# Patient Record
Sex: MALE | Race: BLACK OR AFRICAN AMERICAN
[De-identification: names, ages, dates, MRNs, and addresses within clinical notes are randomized per-mention and may not be internally consistent; named-entity substitution may affect disease eponyms.]

---

## 2019-06-17 ENCOUNTER — HOSPITAL ENCOUNTER (EMERGENCY)
Dept: HOSPITAL 88 - ER | Age: 39
Discharge: HOME | End: 2019-06-17
Payer: MEDICARE

## 2019-06-17 VITALS — WEIGHT: 142 LBS | HEIGHT: 70 IN | BODY MASS INDEX: 20.33 KG/M2

## 2019-06-17 DIAGNOSIS — B20: ICD-10-CM

## 2019-06-17 DIAGNOSIS — R10.30: Primary | ICD-10-CM

## 2019-06-17 DIAGNOSIS — K40.91: ICD-10-CM

## 2019-06-17 PROCEDURE — 99282 EMERGENCY DEPT VISIT SF MDM: CPT

## 2019-06-17 NOTE — XMS REPORT
[facility] Services Summary

                             Created on: 2019



Kvng Oviedo

External Reference #: [id number]

: 1980

Sex: Male



Demographics







                          Address                   [address] [place]  [address]

 

                          Home Phone                [phone]

 

                          Preferred Language        English

 

                          Marital Status            A

 

                          [employer] Affiliation     Unknown

 

                          Race                      Unknown

 

                          Ethnic Group              Unknown





Author







                          Author                    Admin, [place]

 

                          Organization              [facility]

 

                          Address                   [address]

[place]  [address]



 

                          Phone                     [phone]







Allergies, Adverse Reactions, Alerts







           Allergy Name    Reaction Description    Start Date    Severity    Status     Provider

 

           No Known Allergies                                         Charly Lazar CMA







Conditions or Problems







        Problem Name    Problem Code    Onset Date    Status    Entry Date    Provider    Comment    Standard

 Description                            Annotate

 

        Cough    786.2        Active        Svetlana Tavera MD            Cough     

 

        Diarrhea    787.91        Active        Svetlana Tavera MD            Diarrhea 

                                         

 

        Bipolar disorder    296.80        Active        Svetlana Tavera MD            Bipolar

 disorder, unspecified                   

 

        Hallucinations    780.1        Active        Svetlana Tavera MD            Hallucinations

                                         

 

           Health care maintenance    V70.0          Active         Svetlana Tavera MD

                                        Routine general medical examination at a health care facility     

 

        Hepatitis B    070.30        Active        Svetlana Tavera MD            Viral 

hepatitis B without mention of hepatic coma, acute or unspecified, without 
mention of hepatitis delta               

 

        Psychosis    298.9        Active        Svetlana Tavera MD            Unspecified

 psychosis                               

 

           Schizoaffective disorder    295.70         Active         Svetlana Tavera MD                                      Schizoaffective disorder, unspecified     

 

        Tobacco use                Active        Svetlana Tavera MD            Tobacco use

 disorder                                

 

        HIV infection    042         Active        Patrice Paniagua            Human immunodeficiency

 virus [HIV] disease                     

 

             Pre-procedural laboratory examination    ICD-V72.63        Inactive

                    Svetlana Tavera MD               

 

             Pre-procedural laboratory examination    V72.63           Resolved      

                Svetlana Tavera MD                    Pre-procedural laboratory examination     







Medication List







        Medication    Instructions    Start Date    Stop Date    Generic Name    NDC     Status    Provider

                                        Patient Instruction

 

                          ABILIFY 10 MG ORAL TABLET    Take 1/2 tablet By Mouth QHS for one week then increase

 to 1 tablet By Mouth QHS                   ARIPIPRAZOLE    [id number]    Active     Dulce Leung PMHNP                                       Active

 

                          HYDROXYZINE HCL 50 MG ORAL TABLET    Take 1 tablet By Mouth BID As Needed for anxiety

                        HYDROXYZINE HCL    [id number]    Active    Dulce Leung PMHNP      

                                        Active

 

             MIRTAZAPINE 30 MG ORAL TABLET    Take 1/2-1 tablet By Mouth QHS                     MIRTAZAPINE

             [id number]    Active       Dulce Leung PMHNP                 Active

 

                    SYMTUZA [id number] MG ORAL TABLET    1 tab by mouth once daily with food    

                    [id number]    [id number]    Active    Svetlana Tavera MD              Active









Vital Signs







           Date       Name       Value      Unit       Range      Description

 

               blood pressure, diastolic    89         mm[Hg]                BP healy

 

               blood pressure, systolic    137        mm[Hg]                BP sys

 

               height E&M    70         [in_us]               Bdy height

 

               pulse rate E&M    90         /min                  Heart rate

 

               respiratory rate E&M    14         /min                  Resp rate

 

               temperature E&M    98.2       [degF]                Body temperature

 

               weight E&M    142        [lb_av]               Weight Measured

 

               blood pressure, diastolic    74         mm[Hg]                BP healy

 

               blood pressure, systolic    120        mm[Hg]                BP sys

 

               height E&M    70         [in_us]               Bdy height

 

               pulse rate E&M    86         /min                  Heart rate

 

               respiratory rate E&M    12         /min                  Resp rate

 

               temperature E&M    98.3       [degF]                Body temperature

 

               weight E&M    150        [lb_av]               Weight Measured







Diagnostic Results







           Date       Name       Value      Unit       Range      Description

 

                                        Lab Report: HBV Real-Time PCR, Quant - Serology 

 

               Hepatitis B virus DNA, by Polymerase Chain Reaction    1900                              

 

                                        Lab Report: CD4/CD8 Ratio Profile, Comp. Metabolic Panel (14), RNA, PCR( ... - Chemistry

 

 

               bilirubin, serum, direct    0.10       mg/dL      0.00-0.40     

 

                                        Lab Report: Lipid Panel - Chemistry 

 

               very low density lipoproteins    12         mg/dL      5-40        

 

                                        Lab Report: CD4/CD8 Ratio Profile, Comp. Metabolic Panel (14), RNA, PCR( ... - Chemistry

 

 

               hepatitis B surface antigen    Positive               Negative     

 

               chloride, serum    103        mmol/L           

 

                                        Lab Report: CD4/CD8 Ratio Profile, Comp. Metabolic Panel (14), RNA, PCR( ... - Serology

 

 

               HIV genotype result    GENRTI                            

 

                                        Append: [facility] Testing - Chemistry 

 

               HIV rapid test results    positive                           

 

                                        Lab Report: CD4/CD8 Ratio Profile, Comp. Metabolic Panel (14), RNA, PCR( ... - Microbiology

 

 

               hepatitis A antibody, total    Positive               Negative     

 

                                        Lab Report: CD4/CD8 Ratio Profile, Comp. Metabolic Panel (14), RNA, PCR( ... - Chemistry

 

 

               urea nitrogen, blood    14         mg/dL      6-24        

 

                                        Lab Report: CD4/CD8 Ratio Profile, Comp. Metabolic Panel (14), RNA, PCR( ... - Serology

 

 

               HIV-1/HIV-2 Ab, serum    Positive               Negative     

 

                                        Lab Report: Urinalysis, Routine, Prot+CreatU (Random), Ct, Ng, Trich vag ... - Urinalysis

 

 

               leukocyte esterase, urine, by dipstick    Negative               Negative     

 

                                        Lab Report: QuantiFERON-TB Gold Plus, QuantiFERON-TB Gold Plus, QuantiFE ... - Hematology

 

 

                              Quantiferon Gold TB blood test for tuberculosis screening    Negative 

                                        Negative             

 

                                        Lab Report: CD4/CD8 Ratio Profile, Comp. Metabolic Panel (14), RNA, PCR( ... - Serology

 

 

               human leukocyte antigen B57    Negative                           

 

                                        Lab Report: Hemoglobin A1c, Hep Be Ag, Hep Be Ab - Serology 

 

               hepatitis B e antigen, serum    Negative               Negative     

 

                                        Lab Report: CD4/CD8 Ratio Profile, Comp. Metabolic Panel (14), RNA, PCR( ... - Hematology

 

 

                 mean corpuscular hemoglobin concentration, RBC    33.6 G/DL    %            31.5-35.7

                                         

 

               erythrocyte (RBC) count    4.89 X10E6/UL    10*6/mm3    4.14-5.80     

 

                                        Lab Report: CD4/CD8 Ratio Profile, Comp. Metabolic Panel (14), RNA, PCR( ... - Serology

 

 

               hepatitis C antibody, serum    <0.1                  0.0-0.9     

 

                                        Lab Report: Urinalysis, Routine, Prot+CreatU (Random), Ct, Ng, Trich vag ... - Urinalysis

 

 

               urine color    Yellow                Yellow      

 

                                        Lab Report: CD4/CD8 Ratio Profile, Comp. Metabolic Panel (14), RNA, PCR( ... - Chemistry

 

 

               absolute CD8    1426                  109-897     

 

                                        Lab Report: Urinalysis, Routine, Prot+CreatU (Random), Ct, Ng, Trich vag ... - Urinalysis

 

 

               bilirubin, urine    Negative               Negative     

 

                                        Lab Report: CD4/CD8 Ratio Profile, Comp. Metabolic Panel (14), RNA, PCR( ... - Chemistry

 

 

               Absolute Neutrophils    3.6 X10E3/UL    10*3/uL    1.4-7.0     

 

                                        Lab Report: Lipid Panel - Chemistry 

 

               LDL cholesterol, serum    88         mg/dL      0-99        

 

                                        Lab Report: CD4/CD8 Ratio Profile, Comp. Metabolic Panel (14), RNA, PCR( ... - Chemistry

 

 

               urea nitrogen/creatinine ratio, serum    15                    9-20        

 

                                        Lab Report: CD4/CD8 Ratio Profile, Comp. Metabolic Panel (14), RNA, PCR( ... - Hematology

 

 

               mean corpuscular volume, RBC    89         fL         79-97       

 

                                        Lab Report: Lipid Panel - Chemistry 

 

               HDL cholesterol, serum    41         mg/dL      >39         

 

                                        Lab Report: CD4/CD8 Ratio Profile, Comp. Metabolic Panel (14), RNA, PCR( ... - Hematology

 

 

               monocytes as percent of blood leukocytes    8          %          Not Estab.     

 

                                        Lab Report: CD4/CD8 Ratio Profile, Comp. Metabolic Panel (14), RNA, PCR( ... - Chemistry

 

 

               albumin/globulin ratio, serum    1.4                   1.2-2.2     

 

               creatinine, serum    0.94       mg/dL      0.76-1.27     

 

                                        Lab Report: Lipid Panel - Chemistry 

 

               cholesterol, serum    141        mg/dL      100-199     

 

                                        Lab Report: Urinalysis, Routine, Prot+CreatU (Random), Ct, Ng, Trich vag ... - Chemistry

 

 

               creatinine, random, urine    116.5      mg/dL      Not Estab.     

 

                                        Lab Report: CD4/CD8 Ratio Profile, Comp. Metabolic Panel (14), RNA, PCR( ... - Chemistry

 

 

               bilirubin, serum, total    0.3        mg/dL      0.0-1.2     

 

                                        Lab Report: CD4/CD8 Ratio Profile, Comp. Metabolic Panel (14), RNA, PCR( ... - Hematology

 

 

               Eosinophil Absolute Count    0.2 X10E3/UL    10*3/uL    0.0-0.4     

 

                                        Lab Report: Urinalysis, Routine, Prot+CreatU (Random), Ct, Ng, Trich vag ... - Lab

 

 

               chlamydia DNA probe    Negative               Negative     

 

                                        Lab Report: Urinalysis, Routine, Prot+CreatU (Random), Ct, Ng, Trich vag ... - Urinalysis

 

 

               appearance, urine    Clear                 Clear       

 

                                        Lab Report: CD4/CD8 Ratio Profile, Comp. Metabolic Panel (14), RNA, PCR( ... - Chemistry

 

 

               aspartate aminotransferase (SGOT), serum    28         U/L        0-40        

 

                                        Lab Report: CD4/CD8 Ratio Profile, Comp. Metabolic Panel (14), RNA, PCR( ... - Hematology

 

 

               red blood cell distribution width    15.2       %          12.3-15.4     

 

                                        Lab Report: Urinalysis, Routine, Prot+CreatU (Random), Ct, Ng, Trich vag ... - Urinalysis

 

 

               [facility]                            

 

               pH, urine, semiquantitative    6.5                   5.0-7.5     

 

                                        Lab Report: CD4/CD8 Ratio Profile, Comp. Metabolic Panel (14), RNA, PCR( ... - Hematology

 

 

               leukocyte count, blood    7.2 X10E3/UL    10*3/mm3    3.4-10.8     

 

                                        Lab Report: CD4/CD8 Ratio Profile, Comp. Metabolic Panel (14), RNA, PCR( ... - Chemistry

 

 

               potassium, serum    4.9        mmol/L     3.5-5.2     

 

               albumin, serum    4.3        g/dL       3.5-5.5     

 

                 immature granulocytes, percentage of total cells, blood    0            %            Not Estab.

                                         

 

                                        Lab Report: CD4/CD8 Ratio Profile, Comp. Metabolic Panel (14), RNA, PCR( ... - Hematology

 

 

                 lymphocyte count, blood, automated    2.7 X10E3/UL    10*3/mm3     0.7-3.1 

                                         

 

                                        Lab Report: Urinalysis, Routine, Prot+CreatU (Random), Ct, Ng, Trich vag ... - Microbiology

 

 

               Neisseria gonorrhoeae DNA probe    Negative               Negative     

 

                                        Lab Report: CD4/CD8 Ratio Profile, Comp. Metabolic Panel (14), RNA, PCR( ... - Hematology

 

 

               hematocrit, blood    43.7       %          37.5-51.0     

 

                                        Lab Report: CD4/CD8 Ratio Profile, Comp. Metabolic Panel (14), RNA, PCR( ... - Chemistry

 

 

               sodium, serum    140        mmol/L     134-144     

 

                                        Lab Report: CD4/CD8 Ratio Profile, Comp. Metabolic Panel (14), RNA, PCR( ... - Serology

 

 

               toxoplasma gondii antibody, IgG    <3.0                  0.0-7.1     

 

                                        Lab Report: CD4/CD8 Ratio Profile, Comp. Metabolic Panel (14), RNA, PCR( ... - Hematology

 

 

               neutrophils as percent of blood leukocytes    51         %          Not Estab.     

 

               basophils as percent of blood leukocytes    1          %          Not Estab.     

 

                                        Lab Report: Urinalysis, Routine, Prot+CreatU (Random), Ct, Ng, Trich vag ... - Chemistry

 

 

               specific gravity, body fluid    1.018                 1.005-1.030     

 

                                        Lab Report: CD4/CD8 Ratio Profile, Comp. Metabolic Panel (14), RNA, PCR( ... - Serology

 

 

               HIV-1RNA, serum, by PCR, quantitative    74182      {Copies}/mL                

 

                                        Lab Report: Urinalysis, Routine, Prot+CreatU (Random), Ct, Ng, Trich vag ... - Urinalysis

 

 

                 protein, urine, semiquantitative (dipstick)    Negative                  Negative/Trace

                                         

 

                                        Lab Report: CD4/CD8 Ratio Profile, Comp. Metabolic Panel (14), RNA, PCR( ... - Toxicology

 

 

               HIV-2 antibodies, western blot    Negative               Negative     

 

                                        Lab Report: CD4/CD8 Ratio Profile, Comp. Metabolic Panel (14), RNA, PCR( ... - Serology

 

 

               rapid plasma reagin antibody, serum    Non Reactive               Non Reactive    

 

 

                                        Lab Report: CD4/CD8 Ratio Profile, Comp. Metabolic Panel (14), RNA, PCR( ... - Chemistry

 

 

               CD4/CD8 ratio    0.56                  0.92-3.72     

 

               carbon dioxide, venous blood    24         mmol/L     20-29       

 

                                        Lab Report: Urinalysis, Routine, Prot+CreatU (Random), Ct, Ng, Trich vag ... - Chemistry

 

 

               nitrate, urine    Negative               Negative     

 

                                        Lab Report: CD4/CD8 Ratio Profile, Comp. Metabolic Panel (14), RNA, PCR( ... - Serology

 

 

               hepatitis B core antibody, total    Positive               Negative     

 

                                        Lab Report: Lipid Panel - Chemistry 

 

               triglyceride, serum, fasting    59         mg/dL      0-149       

 

                                        Lab Report: CD4/CD8 Ratio Profile, Comp. Metabolic Panel (14), RNA, PCR( ... - Chemistry

 

 

               calcium, serum    9.9        mg/dL      8.7-10.2     

 

               alanine aminotransferase (SGPT), serum    14         U/L        0-44        

 

                                        Lab Report: CD4/CD8 Ratio Profile, Comp. Metabolic Panel (14), RNA, PCR( ... - Hematology

 

 

               mean corpuscular hemoglobin, RBC    30.1       pg         26.6-33.0     

 

                                        Lab Report: CD4/CD8 Ratio Profile, Comp. Metabolic Panel (14), RNA, PCR( ... - Chemistry

 

 

               protein, total, serum    7.3        g/dL       6.0-8.5     

 

               alkaline phosphatase, serum    118        U/L              

 

                                        Lab Report: CD4/CD8 Ratio Profile, Comp. Metabolic Panel (14), RNA, PCR( ... - Hematology

 

 

                 T-helper cells (CD4) as percent of blood lymphocytes    29.7         %            30.8-58.5

                                         

 

               hemoglobin, blood    14.7       g/dL       13.0-17.7     

 

                 T-suppressor cells (CD8) as percent of blood lymphocytes    52.8         %            12.0-35.5

                                         

 

               lymphocytes as percent of blood leukocytes    38         %          Not Estab.     

 

                                        Lab Report: Hemoglobin A1c, Hep Be Ag, Hep Be Ab - Chemistry 

 

               hemoglobin A1C, blood, as % of total hemoglobin    5.3        %          4.8-5.6     

 

                                        Lab Report: Urinalysis, Routine, Prot+CreatU (Random), Ct, Ng, Trich vag ... - Urinalysis

 

 

               glucose, urine, semiquantitative    Negative               Negative     

 

                                        Lab Report: CD4/CD8 Ratio Profile, Comp. Metabolic Panel (14), RNA, PCR( ... - Genetics/fertility

 

 

               eGFR if African American    117        mL/min/1.73m2    >59         

 

                                        Lab Report: CD4/CD8 Ratio Profile, Comp. Metabolic Panel (14), RNA, PCR( ... - Hematology

 

 

               basophil count, absolute    0.1 x10E3/uL               0.0-0.2     

 

                                        Lab Report: CD4/CD8 Ratio Profile, Comp. Metabolic Panel (14), RNA, PCR( ... - Chemistry

 

 

               globulin, serum    3.0                   1.5-4.5     

 

                 Estimated Glomerular Filtration Rate (calc)    101          mL/min/1.73m2    >59

                                         

 

                                        Lab Report: Urinalysis, Routine, Prot+CreatU (Random), Ct, Ng, Trich vag ... - Basic

 

 

               Occult Blood, urine    Negative               Negative     

 

                                        Lab Report: CD4/CD8 Ratio Profile, Comp. Metabolic Panel (14), RNA, PCR( ... - Serology

 

 

               hepatitis B surface antibody    Reactive                           

 

                                        Lab Report: CD4/CD8 Ratio Profile, Comp. Metabolic Panel (14), RNA, PCR( ... - Hematology

 

 

               eosinophils as percent of blood leukocytes    2          %          Not Estab.     

 

                                        Lab Report: Hemoglobin A1c, Hep Be Ag, Hep Be Ab - Serology 

 

               hepatitis B e antibody, serum    Positive               Negative     

 

                                        Lab Report: CD4/CD8 Ratio Profile, Comp. Metabolic Panel (14), RNA, PCR( ... - Chemistry

 

 

               blood glucose, random    88         mg/dL      65-99       

 

                                        Lab Report: Urinalysis, Routine, Prot+CreatU (Random), Ct, Ng, Trich vag ... - Chemistry

 

 

               protein, total urine random    36.6       mg/dL      Not Estab.     

 

                                        Lab Report: Urinalysis, Routine, Prot+CreatU (Random), Ct, Ng, Trich vag ... - Urinalysis

 

 

               urobilinogen, urine, semiquantitative (dipstick)    1.0                   0.2-1.0     



 

                                        Lab Report: CD4/CD8 Ratio Profile, Comp. Metabolic Panel (14), RNA, PCR( ... - Hematology

 

 

               monocyte count, blood, automated    0.6 X10E3/UL    10*3/uL    0.1-0.9     

 

               T-helper cells (CD4) count    802 /UL    uL         359-1519     

 

                                        Lab Report: Urinalysis, Routine, Prot+CreatU (Random), Ct, Ng, Trich vag ... - Urinalysis

 

 

               ketones, urine, by test strip    Negative               Negative     

 

                                        Lab Report: CD4/CD8 Ratio Profile, Comp. Metabolic Panel (14), RNA, PCR( ... - Hematology

 

 

               platelet count    321 X10E3/UL    10*3/mm3    150-450     







Encounters







             Date         Encounter    Provider     Code         Facility

 

                 08:39:43 CDT    Est Patient Exp Problem - 80602    Svetlana Tavera MD    CPT-[id number]

                                        [facility]

 

                 11:25:42 CDT    New Patient Comprehensive - [id number]    Svetlana Tavera MD    CPT-[id number]

                                        [facility]







Procedures







             Code         Procedure Name    Date         Entry Date    Standard Description

 

                          CPT-22858                 Addl Vx - Ix admin via ID IM or jet injects without counseling by physician

                     18:21:23 CDT               

 

                CPT-11118       Menactra Intramuscular Injectable     18:21:23 CDT    

                                         

 

                          CPT-41874                 First Vx - Ix admin via ID IM or jet injects without counseling by physician

                     18:21:23 CDT               

 

                CPT-11177       Prevnar 13 Intramuscular Suspension     18:21:23 CDT    

                                         

 

                CPT-08835       Diagnostic evaluation (no medical) - 57167     13:22:36 CDT    

                               

 

                CPT-       Health Education/Supportive Counseling     16:33:05 CDT    

                                         

 

                CPT-66382       Handling of specimen for transfer     13:41:12 CDT    

                                         

 

             CPT-35711    Venipuncture     13:41:12 CDT         

 

                CPT-       Health Education/Supportive Counseling     13:43:18 CDT

## 2019-06-17 NOTE — XMS REPORT
Patient Summary Document

                             Created on: 2019



ASHANTI BUSH

External Reference #: [id number]

: 1980

Sex: Male



Demographics







                          Address                   [address]

[place]  [address]

 

                          Home Phone                [phone]

 

                          Preferred Language        Unknown

 

                          Marital Status            Unknown

 

                          Temple Affiliation     Unknown

 

                          Race                      Unknown

 

                          Ethnic Group              Unknown





Author







                          Author                    [facility]

 

                          [facility]

 

                          Address                   Unknown

 

                          Phone                     Unavailable







Support







                Name            Relationship    Address         Phone

 

                    ALDO TAYLOR    PRS                 N/A

[place]  [address] [phone]

 

                    YUDELKAVINCENT      PRS                 N/A

[place], [place]  [address] [phone]

 

                    NONE,  GIVEN        PRS                 [address]

[place]  [address] [phone]







Care Team Providers







                    Care Team Member Name    Role                Phone

 

                          Unavailable               Unavailable







Payers







             Payer Name    Policy Type    Policy Number    Effective Date    Expiration Date







Problems

This patient has no known problems.



Allergies, Adverse Reactions, Alerts







          Allergy Name    Allergy Type    Status    Severity    Reaction(s)    Onset Date    Inactive 

Date                      Treating Clinician        Comments

 

        No Known Allergies    DA      Active    U               2019 00:00:00                     

 

        No Known Allergies    DA      Active    U               2019 00:00:00                     

 

        No Known Allergies    DA      Active    U               2019 00:00:00                     







Medications

This patient has no known medications.



Results







           Test Description    Test Time    Test Comments    Text Results    Atomic Results    Result

 Comments

 

                - CT ABD PELVIS W/CONT    2019 11:43:00                      Name: ASHANTI BUSH          

          [facility]                     : 1980 Age/S: 38  / M     
   [address] [facility]                Unit #: [id number]     Loc:               
[place]  [address]              Phys: Stephen Alamo MD                   
                           Acct: [id number]  Dis Date:               Status: 
REG ER                                  PHONE #: [phone]     Exam Date: 
2019  1118                     FAX #: [phone]      Reason: LEFT GROIN
PAIN                                     EXAMS:                                 
             CPT CODE:      [id number] CT ABD PELVIS W/CONT                      
[address]                    EXAM: CT of the abdomen and pelvis with contrast;      
        INFORMATION: Left groin pain;               TECHNIQUE:                CT
dose reduction protocol;       5 mm cuts were obtained through the abdomen and 
pelvis during and       after intravenous infusion of contrast material.        
      FINDINGS:       Liver, spleen and pancreas are of normal size and shape; 
they show       homogeneous enhancement without focal lesions.        No 
abnormalities of the biliary system.       Adrenal glands and kidneys are 
unremarkable; no evidence of       adenopathy;       No evidence of appendicitis
or other acute bowel abnormalities. Status       post gastric bypass surgery.   
   No pelvic mass lesions.       No abnormal fluid collections.       Bilateral 
varicoceles.       Scans through the lung bases are clear.                      
  IMPRESSION:                   1. No evidence of acute abdominal or pelvic 
abnormalities.         2. Bilateral varicoceles.                             ** 
Electronically Signed by MARYLU Blanchard **           **             on 
2019 at 1143             **                      Reported and signed by: 
Samir Blanchard M.D.         CC: Stephen Alamo MD                         
                                                                                
        Technologist:Anabela Grant RT(R)(CT)     CTDI:        DLP:        
Trnscb Date/Time: 2019 (1143) [email or web address]                        Orig Print 
D/T: S: 2019 (1146)       CTDI:          DLP:          PAGE  1            
          Signed Report                                    

 

                URINALYSIS COMPLETE    2019 11:02:00                      

 

   

 

                UA COLOR (test code=COLU)    YELLOW          YELLOW           

 

                UA APPEARANCE (test code=APPU)    CLEAR           CLEAR            

 

                UA GLUCOSE DIPSTICK (test code=DGLUU)    NEGATIVE mg/dL    NEGATIVE         

 

                UA BILIRUBIN DIPSTICK (test code=BILU)    NEGATIVE mg/dL    NEGATIVE         

 

                UA KETONE DIPSTICK (test code=KETU)    NEGATIVE mg/dL    NEGATIVE         

 

                UA SPECIFIC GRAVITY (test code=SGU)    1.023           1.001-1.035      

 

                UA BLOOD DIPSTICK (test code=PHILLIP)    Negative mg/dL    NEGATIVE         

 

                UA PH DIPSTICK (test code=JARED)    6.0             5.0-8.0          

 

                UA PROTEIN DIPSTICK (test code=PROU)    NEGATIVE mg/dL    NEGATIVE         

 

                UA UROBILINIOGEN DIPSTICK (test code=URO)    1 mg/dL  (1+) mg/dL    0.0-0.2          

 

                UA NITRITE DIPSTICK (test code=RODNEY)    NEGATIVE        NEGATIVE         

 

                UA LEUKOCYTE ESTERASE W REFLEX (test code=LEUUR)    NEGATIVE Susie/uL    NEGATIVE         

 

                UA WBC (test code=WBCU)    0-5 per HPF     0-5              

 

                UA RBC (test code=RBCU)    3-5 #/HPF       0-5              

 

                UA EPITHELIAL CELLS (test code=EPIU)    FEW per HPF     FEW              

 

                UA BACTERIA (test code=BACU)    NONE SEEN #/HPF    NONE             

 

                UA MUCUS (test code=MUCU)    FEW #/LPF       FEW              





Urine Source? Clean CatchURINALYSIS [id number] 10:53:00* 





                Test Item       Value           Reference Range    Comments

 

                UA COLOR (test code=COLU)    YELLOW          YELLOW           

 

                UA APPEARANCE (test code=APPU)    CLEAR           CLEAR            

 

                UA GLUCOSE DIPSTICK (test code=DGLUU)    NEGATIVE mg/dL    NEGATIVE         

 

                UA BILIRUBIN DIPSTICK (test code=BILU)    NEGATIVE mg/dL    NEGATIVE         

 

                UA KETONE DIPSTICK (test code=KETU)    NEGATIVE mg/dL    NEGATIVE         

 

                UA SPECIFIC GRAVITY (test code=SGU)    1.023           1.001-1.035      

 

                UA BLOOD DIPSTICK (test code=PHILLIP)    Negative mg/dL    NEGATIVE         

 

                UA PH DIPSTICK (test code=JARED)    6.0             5.0-8.0          

 

                UA PROTEIN DIPSTICK (test code=PROU)    NEGATIVE mg/dL    NEGATIVE         

 

                UA UROBILINIOGEN DIPSTICK (test code=URO)     mg/dL          0.0-0.2          

 

                UA NITRITE DIPSTICK (test code=RODNEY)    NEGATIVE        NEGATIVE         

 

                UA LEUKOCYTE ESTERASE W REFLEX (test code=LEUUR)    NEGATIVE Susie/uL    NEGATIVE         

 

                UA WBC (test code=WBCU)     per HPF        0-5              





Urine Source? Clean Catch- DUP AB/PEL/SC [id number] 10:41:00  Name: 
ASHANTI BUSH                    [facility]                     : 
1980 Age/S: 38  / M         [address] [facility]                Unit #: [id number]     Loc:               [place]  [address]              Phys: Stella Jacobson  NP                                                  Acct: [id number]  Di
s Date:               Status: REG ER                                  PHONE #: [phone]
[phone]     Exam Date: 2019  1015                     FAX #: [phone]
[phone]      Reason: SCROTAL PAIN                                        EXAMS:     
                                         CPT CODE:      [id number] DUP AB/PEL/SC 
COMP                         [id number]                    EXAM: Scrotal ultrasound 
and duplex sonography;               INFORMATION: Scrotal pain;               TE
CHNIQUE AND FINDINGS:       Grayscale imaging was combined with color Doppler so
nography and       spectral analysis.       The testicles of normal size and sha
pe. They show homogeneous       echotexture and normal flow pattern on Doppler e
xam;       The right testicle measures 3.8 x 1.9 x 3 cm.       The left testicle
measures 3.2 x 2.1 x 3 cm.       The epididymis is unremarkable bilaterally.    
  No hydroceles.       Small left varicocele.                 IMPRESSION:       
 1. No testicular abnormalities and no abnormalities of the         epididymi
анна.         2. Small left varicocele.                   ** Electronically Dee
d by MARYLU Blanchard **           **             on 2019 at 1041     
       **                      Reported and signed by: Samir Blanchard M.D.    
               CC: Maia Jacobson NP
hnologist: LYLE ZHANG                                  Trnscb Date/Ti
me: 2019 (1041) [email or web address]                        Orig Print D/T: S: 2019 (1044)     Probe:                       PAGE  1                       Dee
d Report                               - US SCROTUM AND [id number] 10:41:00 
[facility] 
1980 Age/S: 38  / M         [address] [facility]                Unit #: [id number]     Loc:               [place]  [address]              Phys: Stella Jacobson NP                                                  Acct: [id number]  Di
s Date:               Status: REG ER                                  PHONE #: [phone]
[phone]     Exam Date: 2019  1015                     FAX #: [phone]
[phone]      Reason: SCROTAL PAIN                                        EXAMS:     
                                         CPT CODE:      [id number] US SCROTUM AND
CNTS                        74731                    EXAM: Scrotal ultrasound 
and duplex sonography;               INFORMATION: Scrotal pain;               TE
CHNIQUE AND FINDINGS:       Grayscale imaging was combined with color Doppler so
nography and       spectral analysis.       The testicles of normal size and sha
pe. They show homogeneous       echotexture and normal flow pattern on Doppler e
xam;       The right testicle measures 3.8 x 1.9 x 3 cm.       The left testicle
measures 3.2 x 2.1 x 3 cm.       The epididymis is unremarkable bilaterally.    
  No hydroceles.       Small left varicocele.                 IMPRESSION:       
 1. No testicular abnormalities and no abnormalities of the         epididymi
анна.         2. Small left varicocele.                   ** Electronically Dee
d by MARYLU Blanchard **           **             on 2019 at 1041     
       **                      Reported and signed by: Samri Blanchard M.D.    
               CC: Maia Jacobson NP
hnologist: LYLE ZHANG                                  Trnscb Date/Ti
me: 2019 (1041) [id number]                        Orig Print D/T: S: 2019 (0534)     Probe:                       PAGE  1                       Dee
d Report                               BASIC METABOLIC [id number] 09:58:00
  * 





                Test Item       Value           Reference Range    Comments

 

                SODIUM (test code=NA)    140 mmol/L      136-145          

 

                POTASSIUM (test code=K)    4.2 mmol/L      3.5-5.1          

 

                CHLORIDE (test code=CL)    107.0 mmol/L               

 

                CARBON DIOXIDE (test code=CO2)    29.0 mmol/L     21-32            

 

                ANION GAP (test code=GAP)    8.2             10-20            

 

                GLUCOSE (test code=GLU)    169 mg/dL                  

 

                BLOOD UREA NITROGEN (test code=BUN)    16 mg/dL        7-18             

 

                GLOMERULAR FILTRATION RATE (test code=GFR)    > 60 mL/min     >=60            Estimated GFR by 

using Modified MDRD formula.Chronic kidney disease is defined as either kidney 
damageor GFR <60 mL/min/1.73 m2 for >3 months.

 

                CREATININE (test code=CREAT)    0.90 mg/dL      0.7-1.3          

 

                BUN/CREATININE RATIO (test code=BUN/CREA)    17.8            10-20            

 

                CALCIUM (test code=CA)    9.4 mg/dL       8.5-10.1         





BASIC METABOLIC [id number] 09:53:00* 





                Test Item       Value           Reference Range    Comments

 

                SODIUM (test code=NA)    140 mmol/L      136-145          

 

                POTASSIUM (test code=K)    4.2 mmol/L      3.5-5.1          

 

                CHLORIDE (test code=CL)    107.0 mmol/L               

 

                CARBON DIOXIDE (test code=CO2)     mmol/L         21-32            

 

                ANION GAP (test code=GAP)                    10-20            

 

                GLUCOSE (test code=GLU)     mg/dL                     

 

                BLOOD UREA NITROGEN (test code=BUN)     mg/dL          7-18             

 

                GLOMERULAR FILTRATION RATE (test code=GFR)     mL/min         >=60             

 

                CREATININE (test code=CREAT)     mg/dL          0.7-1.3          

 

                BUN/CREATININE RATIO (test code=BUN/CREA)                    10-20            

 

                CALCIUM (test code=CA)    9.4 mg/dL       8.5-10.1         





CBC W/O [id number] 09:34:00* 





                Test Item       Value           Reference Range    Comments

 

                WHITE BLOOD CELL (test code=WBC)    7.8 K/mm3       4.5-12.5         

 

                RED BLOOD CELL (test code=RBC)    5.06 mill/mm3    4.0-5.8          

 

                HEMOGLOBIN (test code=HGB)    14.6 gram/dL    13.0-17.5        

 

                HEMATOCRIT (test code=HCT)    47.4 %          42.0-52.0        

 

                MEAN CELL VOLUME (test code=MCV)    93.7 fL         80-98            

 

                MEAN CELL HGB (test code=MCH)    28.9 picogram    27.0-33.0        

 

                MEAN CELL HGB CONCETRATION (test code=MCHC)    30.8 gram/dL    33.0-36.0        

 

                RED CELL DISTRIBUTION WIDTH (test code=RDW)    13.5 %          11.6-16.2        

 

                PLATELET COUNT (test code=PLT)    279 K/mm3       150-450          

 

                MEAN PLATELET VOLUME (test code=MPV)    9.9 fL          6.7-11.0         





CBC W/O [id number] 09:33:00* 





                Test Item       Value           Reference Range    Comments

 

                WHITE BLOOD CELL (test code=WBC)     K/mm3          4.5-12.5         

 

                RED BLOOD CELL (test code=RBC)     mill/mm3       4.0-5.8          

 

                HEMOGLOBIN (test code=HGB)    14.6 gram/dL    13.0-17.5        

 

                HEMATOCRIT (test code=HCT)    47.4 %          42.0-52.0        

 

                MEAN CELL VOLUME (test code=MCV)     fL             80-98            

 

                MEAN CELL HGB (test code=MCH)     picogram       27.0-33.0        

 

                MEAN CELL HGB CONCETRATION (test code=MCHC)     gram/dL        33.0-36.0        

 

                RED CELL DISTRIBUTION WIDTH (test code=RDW)     %              11.6-16.2        

 

                PLATELET COUNT (test code=PLT)     K/mm3          150-450          

 

                MEAN PLATELET VOLUME (test code=MPV)     fL             6.7-11.0